# Patient Record
Sex: FEMALE | Race: WHITE | NOT HISPANIC OR LATINO | Employment: FULL TIME | URBAN - METROPOLITAN AREA
[De-identification: names, ages, dates, MRNs, and addresses within clinical notes are randomized per-mention and may not be internally consistent; named-entity substitution may affect disease eponyms.]

---

## 2017-06-01 ENCOUNTER — TRANSCRIBE ORDERS (OUTPATIENT)
Dept: ENDOCRINOLOGY | Facility: CLINIC | Age: 37
End: 2017-06-01

## 2017-06-01 DIAGNOSIS — O99.280 HYPERTHYROIDISM IN PREGNANCY, ANTEPARTUM, UNSPECIFIED TRIMESTER: ICD-10-CM

## 2017-06-01 DIAGNOSIS — E05.90 HYPERTHYROIDISM IN PREGNANCY, ANTEPARTUM, UNSPECIFIED TRIMESTER: ICD-10-CM

## 2017-06-01 DIAGNOSIS — E05.90 HYPERTHYROIDISM: Primary | ICD-10-CM

## 2019-06-19 ENCOUNTER — TELEPHONE (OUTPATIENT)
Dept: SURGERY | Facility: CLINIC | Age: 39
End: 2019-06-19

## 2019-06-19 NOTE — TELEPHONE ENCOUNTER
Pt is scheduled w/ Dr. Parker today 6/19/19 and Dr. Parker will not be back in the office today, I called pt on 6/18/19 & again this morning, LVM for her to return my call. Will need to try and schedule pt for Monday 6/24/19.

## 2019-07-15 ENCOUNTER — HOSPITAL ENCOUNTER (EMERGENCY)
Facility: HOSPITAL | Age: 39
Discharge: HOME | End: 2019-07-15
Attending: EMERGENCY MEDICINE | Admitting: EMERGENCY MEDICINE

## 2019-07-15 VITALS
RESPIRATION RATE: 16 BRPM | SYSTOLIC BLOOD PRESSURE: 100 MMHG | OXYGEN SATURATION: 100 % | DIASTOLIC BLOOD PRESSURE: 57 MMHG | HEART RATE: 96 BPM | TEMPERATURE: 99 F

## 2019-07-15 DIAGNOSIS — F31.9 BIPOLAR 1 DISORDER (HCC): Primary | ICD-10-CM

## 2019-07-15 LAB
AMPHET+METHAMPHET UR QL: NEGATIVE
BARBITURATES UR QL SCN: NORMAL
BENZODIAZ UR QL SCN: NEGATIVE
CANNABINOIDS SERPL QL: NEGATIVE
COCAINE UR QL: NEGATIVE
CREAT UR-MCNC: 211.2 MG/DL
METHADONE UR QL SCN: NEGATIVE
OPIATES UR QL: NEGATIVE
PCP UR QL SCN: NEGATIVE

## 2019-07-15 PROCEDURE — 99284 EMERGENCY DEPT VISIT MOD MDM: CPT

## 2019-07-15 PROCEDURE — 82570 ASSAY OF URINE CREATININE: CPT | Performed by: EMERGENCY MEDICINE

## 2019-07-15 PROCEDURE — 80307 DRUG TEST PRSMV CHEM ANLYZR: CPT | Performed by: EMERGENCY MEDICINE

## 2021-10-03 ENCOUNTER — HOSPITAL ENCOUNTER (EMERGENCY)
Facility: HOSPITAL | Age: 41
Discharge: LEFT BEFORE DISCHARGE | End: 2021-10-03

## 2021-10-03 VITALS
SYSTOLIC BLOOD PRESSURE: 120 MMHG | WEIGHT: 184.4 LBS | BODY MASS INDEX: 31.48 KG/M2 | TEMPERATURE: 103 F | HEIGHT: 64 IN | RESPIRATION RATE: 18 BRPM | HEART RATE: 125 BPM | OXYGEN SATURATION: 98 % | DIASTOLIC BLOOD PRESSURE: 84 MMHG

## 2021-10-03 PROCEDURE — 99211 OFF/OP EST MAY X REQ PHY/QHP: CPT
